# Patient Record
Sex: MALE | Race: WHITE
[De-identification: names, ages, dates, MRNs, and addresses within clinical notes are randomized per-mention and may not be internally consistent; named-entity substitution may affect disease eponyms.]

---

## 2019-04-05 ENCOUNTER — HOSPITAL ENCOUNTER (EMERGENCY)
Dept: HOSPITAL 62 - ER | Age: 59
LOS: 1 days | Discharge: HOME | End: 2019-04-06
Payer: OTHER GOVERNMENT

## 2019-04-05 DIAGNOSIS — G54.2: Primary | ICD-10-CM

## 2019-04-05 DIAGNOSIS — R20.2: ICD-10-CM

## 2019-04-05 DIAGNOSIS — M25.511: ICD-10-CM

## 2019-04-05 DIAGNOSIS — F17.200: ICD-10-CM

## 2019-04-05 PROCEDURE — 99283 EMERGENCY DEPT VISIT LOW MDM: CPT

## 2019-04-05 PROCEDURE — 96372 THER/PROPH/DIAG INJ SC/IM: CPT

## 2019-04-05 PROCEDURE — 73030 X-RAY EXAM OF SHOULDER: CPT

## 2019-04-06 VITALS — DIASTOLIC BLOOD PRESSURE: 88 MMHG | SYSTOLIC BLOOD PRESSURE: 132 MMHG

## 2019-04-06 NOTE — ER DOCUMENT REPORT
ED General





- General


Chief Complaint: Shoulder Pain


Stated Complaint: SHOULDER PAIN


Time Seen by Provider: 04/06/19 00:41


Notes: 





Patient is a 58-year-old male without chronic medical problems who presents with

36 hours of right shoulder pain as well as paresthesias in his right forearm and

right hand.  Pain is regarded as being severe, was gradual in onset, has been 

worsening since that time.  Described as a throbbing, burning pain.  States the 

pain is worsened by lying down and trying to sleep.  Has tried ibuprofen and 

Tylenol with no improvement.  Denies any known injury.  No history of similar 

symptoms in the past.  Has not seen his general physician regarding today's 

concerns.  Denies any focal weakness or loss of sensation.  He is right-hand 

dominant.  Has a remote history of a cervical spine surgery without fusion.


TRAVEL OUTSIDE OF THE [place] IN LAST 30 DAYS: No





- Related Data


Allergies/Adverse Reactions: 


                                        





No Known Allergies Allergy (Unverified 04/06/19 01:41)


   











Past Medical History





- General


Information source: Patient





- Social History


Smoking Status: Current Every Day Smoker


Chew tobacco use (# tins/day): No


Frequency of alcohol use: None


Drug Abuse: None


Lives with: Alone


Family History: Reviewed & Not Pertinent


Patient has suicidal ideation: No


Patient has homicidal ideation: No


Renal/ Medical History: Denies: Hx Peritoneal Dialysis


Past Surgical History: Reports: Hx Orthopedic Surgery - neck x 2





Review of Systems





- Review of Systems


Notes: 





Constitutional: Negative for fever.


HENT: Negative for sore throat.


Eyes: Negative for visual changes.


Cardiovascular: Negative for chest pain.


Respiratory: Negative for shortness of breath.


Gastrointestinal: Negative for abdominal pain, vomiting or diarrhea.


Genitourinary: Negative for dysuria.


Musculoskeletal: Positive for right shoulder pain


Skin: Negative for rash.


Neurological: Negative for headaches, weakness or numbness.  Positive for 

paresthesias of the right forearm and right hand





10 point ROS negative except as marked above and in HPI.





Physical Exam





- Vital signs


Vitals: 





                                        











Temp Pulse Resp BP Pulse Ox


 


 97.8 F   68   20   135/86 H  99 


 


 04/05/19 23:36  04/05/19 23:36  04/05/19 23:36  04/05/19 23:36  04/05/19 23:36











Interpretation: Normal


Notes: 





PHYSICAL EXAMINATION:





GENERAL: Well-appearing, well-nourished and in no acute distress.





HEAD: Atraumatic, normocephalic.





EYES: Pupils equal round and reactive to light, extraocular movements intact, 

sclera anicteric, conjunctiva are normal.





ENT: nares patent, oropharynx clear without exudates.  Moist mucous membranes.





NECK: Normal range of motion, supple without lymphadenopathy, no midline 

cervical spine tenderness, step-offs or deformities.  Mild pain on probation of 

the right trapezius.  Pain with axial loading with the head tilted towards the 

right





LUNGS: Breath sounds clear to auscultation bilaterally and equal.  No wheezes 

rales or rhonchi.





HEART: Regular rate and rhythm without murmurs





ABDOMEN: Soft, nontender, normoactive bowel sounds.  No guarding, no rebound.  

No masses appreciated.





EXTREMITIES: Normal range of motion, no pitting or edema.  No cyanosis.





NEUROLOGICAL: RMU motor and sensory distribution is intact bilaterally including

against resistance on motor testing.  5 out of 5 biceps and triceps strength 

bilaterally.





PSYCH: Normal mood, normal affect.





SKIN: Warm, Dry, normal turgor, no rashes or lesions noted.





Course





- Re-evaluation


Re-evalutation: 





04/06/19 01:55


Patient presents with 36 hours of pain in his right shoulder as well as 

paresthesias in his right forearm and hand.  Has a history of cervical spine 

surgery.  On exam no focal neurologic deficits.  RMU motor and sensory 

distribution is intact bilaterally including against resistance on motor 

testing.  X-ray of the right shoulder is normal.  Patient has increased pain 

with tilting of the head toward the right with axial loading.  Examination of 

the shoulder, trapezius and arm otherwise unremarkable without evidence of 

swelling, deformity or effusions.  Suspect cervical nerve root impingement given

history and exam.  Very low clinical suspicion for ligamentous injury of the 

shoulder, skeletal injury, stroke, carotid artery dissection or alternative 

life-threatening pathology.  Patient has been started on gabapentin, anti-

inflammatories, tramadol.  At this time will discharge with return precautions 

and follow-up recommendations.  Verbal discharge instructions given a the 

bedside and opportunity for questions given. Medication warnings reviewed. 

Patient is in agreement with this plan and has verbalized understanding of 

return precautions and the need for primary care follow-up in the next 24-72 

hours.





- Vital Signs


Vital signs: 





                                        











Temp Pulse Resp BP Pulse Ox


 


 97.8 F   68   20   135/86 H  99 


 


 04/05/19 23:36  04/05/19 23:36  04/05/19 23:36  04/05/19 23:36  04/05/19 23:36














- Diagnostic Test


Radiology reviewed: Image reviewed, Reports reviewed


Radiology results interpreted by me: 





04/06/19 01:56


Right shoulder x-ray: No acute fracture or dislocation





Discharge





- Discharge


Clinical Impression: 


 Cervical nerve root impingement





Right shoulder pain


Qualifiers:


 Chronicity: acute Qualified Code(s): M25.511 - Pain in right shoulder





Condition: Good


Disposition: HOME, SELF-CARE


Additional Instructions: 


Your pain is related to impingement one of your cervical nerve roots and will 

take 6-8 weeks completely resolved.  For your pain: Take ibuprofen 600 mg and 

acetaminophen 1000 mg every 6 hours together as needed for pain.  If this does 

not control your pain you may take 50 mg of tramadol every 6 hours.  Use 

gabapentin 300 mg 3 times daily.  In addition to this, purchased the product 

that is sold over-the-counter cold Aspercreme with lidocaine.  Apply to the 

affected area per bottle instructions.  You should also apply heat to the area 

regularly using an electric heating plant pad.  Return to the emergency 

department immediately if you develop weakness, loss of sensation, chest pain, 

shortness of breath, have worsening of your symptoms, or any other symptoms that

are worrisome to you.


Prescriptions: 


Tramadol HCl [Ultram] 50 mg PO Q6HP PRN #8 tablet


 PRN Reason: 


Gabapentin [Neurontin 300 mg Capsule] 300 mg PO Q8 #90 cap

## 2019-04-13 ENCOUNTER — HOSPITAL ENCOUNTER (EMERGENCY)
Dept: HOSPITAL 62 - ER | Age: 59
LOS: 1 days | Discharge: HOME | End: 2019-04-14
Payer: OTHER GOVERNMENT

## 2019-04-13 VITALS — DIASTOLIC BLOOD PRESSURE: 92 MMHG | SYSTOLIC BLOOD PRESSURE: 151 MMHG

## 2019-04-13 DIAGNOSIS — G54.2: Primary | ICD-10-CM

## 2019-04-13 DIAGNOSIS — M25.511: ICD-10-CM

## 2019-04-13 DIAGNOSIS — F17.200: ICD-10-CM

## 2019-04-13 PROCEDURE — 99284 EMERGENCY DEPT VISIT MOD MDM: CPT

## 2019-04-13 PROCEDURE — 93010 ELECTROCARDIOGRAM REPORT: CPT

## 2019-04-13 PROCEDURE — 93005 ELECTROCARDIOGRAM TRACING: CPT

## 2019-04-13 NOTE — RADIOLOGY REPORT (SQ)
3 VIEWS OF THE RIGHT SHOULDER



HISTORY: Shoulder pain.



COMPARISON: 4/6/2019



FINDINGS:



No acute fracture is seen. The joint spaces are preserved. The

soft tissues are unremarkable.



IMPRESSION:



Unremarkable right shoulder radiographs.

## 2019-09-20 ENCOUNTER — HOSPITAL ENCOUNTER (EMERGENCY)
Dept: HOSPITAL 62 - ER | Age: 59
Discharge: LEFT BEFORE DISCHARGE | End: 2019-09-20
Payer: OTHER GOVERNMENT

## 2019-09-20 VITALS — SYSTOLIC BLOOD PRESSURE: 142 MMHG | DIASTOLIC BLOOD PRESSURE: 98 MMHG

## 2019-09-20 DIAGNOSIS — Z53.21: Primary | ICD-10-CM

## 2019-09-20 DIAGNOSIS — M79.673: ICD-10-CM

## 2020-06-17 NOTE — ER DOCUMENT REPORT
ED General





- General


Chief Complaint: Shoulder Pain


Stated Complaint: RIGHT SOULDER AND SIDE PAIN


Time Seen by Provider: 04/13/19 22:44


Primary Care Provider: 


[facility] [[facility] - Follow up as needed


Notes: 





Patient is a 58-year-old male with a past medical history of current everyday 

tobacco use who presents with ongoing right shoulder pain.  Patient was seen 

partially 1 week ago by me the same symptoms.  States that he has been using 

gabapentin, ibuprofen, Tylenol with moderate relief of the symptoms but states 

that they are often still so severe that he cannot sleep at night.  Does 

describe the pain to his right shoulder as being severe, throbbing, constant 

pain worsened by any attempt at movement of the extremity.  He states that he 

[facility] but has not if been able to get an 

appointment.  He denies any loss of sensation, motor weakness, chest pain, 

shortness of breath.  States his symptoms are not different but they are not 

improving from when I last saw him.


TRAVEL OUTSIDE OF THE [place] IN LAST 30 DAYS: No





- Related Data


Allergies/Adverse Reactions: 


                                        





No Known Allergies Allergy (Verified 04/13/19 21:36)


   











Past Medical History





- General


Information source: Patient





- Social History


Smoking Status: Current Every Day Smoker


Frequency of alcohol use: None


Drug Abuse: None


Lives with: Family


Family History: Reviewed & Not Pertinent


Patient has suicidal ideation: No


Patient has homicidal ideation: No


Renal/ Medical History: Denies: Hx Peritoneal Dialysis


Past Surgical History: Reports: Hx Orthopedic Surgery - neck x 2





Review of Systems





- Review of Systems


Notes: 





Constitutional: Negative for fever.


HENT: Negative for sore throat.


Eyes: Negative for visual changes.


Cardiovascular: Negative for chest pain.


Respiratory: Negative for shortness of breath.


Gastrointestinal: Negative for abdominal pain, vomiting or diarrhea.


Genitourinary: Negative for dysuria.


Musculoskeletal: Positive for right shoulder pain


Skin: Negative for rash.


Neurological: Negative for headaches, weakness or numbness.





10 point ROS negative except as marked above and in HPI.





Physical Exam





- Vital signs


Vitals: 


                                        











Temp Pulse Resp BP Pulse Ox


 


 98.4 F   65   20   151/92 H  96 


 


 04/13/19 21:43  04/13/19 21:43  04/13/19 21:43  04/13/19 21:43  04/13/19 21:43











Interpretation: Hypertensive


Notes: 





PHYSICAL EXAMINATION:





GENERAL: Appears moderately uncomfortable but in no acute distress





HEAD: Atraumatic, normocephalic.





EYES: Pupils equal round and reactive to light, extraocular movements intact, 

sclera anicteric, conjunctiva are normal.





ENT: nares patent, oropharynx clear without exudates.  Moist mucous membranes.





NECK: Normal range of motion, supple without lymphadenopathy





LUNGS: Breath sounds clear to auscultation bilaterally and equal.  No wheezes 

rales or rhonchi.





HEART: Regular rate and rhythm without murmurs





ABDOMEN: Soft, nontender, normoactive bowel sounds.  No guarding, no rebound.  

No masses appreciated.





EXTREMITIES: Patient is unable to range the right shoulder above 90 degrees and 

has significant pain with elevation above 45 degrees.  No obvious erythema, 

fluctuance or warmth to the joint.  Extremity examination otherwise globally 

unremarkable.





NEUROLOGICAL: RMU motor and sensory distribution is intact including against 

resistance bilaterally.  5 out of 5 biceps and triceps strength bilaterally.





PSYCH: Normal mood, normal affect.





SKIN: Warm, Dry, normal turgor, no rashes or lesions noted.





Course





- Re-evaluation


Re-evalutation: 





04/14/19 00:01


Patient presents with signs and symptoms most consistent with a cervical nerve 

root impingement likely at C6-7.  I did see this patient approximately 1 week 

ago for the same and he states he has returned because his pain is not 

controlled, symptoms not improving and he has not been able to follow with his 

primary care doctor.  The pain pattern continues to be consistent patient has 

severe pain in his right shoulder in the periscapular region, on the right 

trapezius radiating down the right upper extremity.  I do not suspect ACS.  The 

patient has no chest pain, no shortness of breath, no pleuritic pain.  The pain 

is dramatically worsened by movement of the shoulder and neck.  There is no 

visible swelling, deformity or erythema to the area.  There is no symptomology 

to suggest a septic joint.  X-ray continues to be without any evidence of 

fracture, effusion or subluxation. .  On neurologic exam patient has 5 out of 5 

biceps and triceps strength.  RMU motor and sensory distribution including 

against resistance on motor testing is noted to be normal.  2+ radial pulse and 

capillary refill is less than 1 second in all digits.  EKG likewise 

unremarkable.  Patient has had improvement of symptoms after receiving analgesia

here in the emergency department.  Advised supportive pillow for sleep at night,

have treated with NSAIDs, have given a small amount of oral morphine for 

nighttime until patient can follow-up with his primary doctor.  At this time 

will discharge with return precautions and follow-up recommendations.  Verbal 

discharge instructions given a the bedside and opportunity for questions given. 

Medication warnings reviewed. Patient is in agreement with this plan and has 

verbalized understanding of return precautions and the need for primary care 

follow-up in the next 24-72 hours.








- Vital Signs


Vital signs: 


                                        











Temp Pulse Resp BP Pulse Ox


 


 98.4 F   65   20   151/92 H  96 


 


 04/13/19 21:43  04/13/19 21:43  04/13/19 21:43  04/13/19 21:43  04/13/19 21:43














- Diagnostic Test


Radiology reviewed: Image reviewed, Reports reviewed


Radiology results interpreted by me: 





04/14/19 05:14


Right shoulder x-ray: No acute fracture or dislocation





- EKG Interpretation by Me


Additional EKG results interpreted by me: 





04/14/19 05:14


Sinus rhythm, rate 60.  No ST elevations or depressions.  QTC is 404.





Discharge





- Discharge


Clinical Impression: 


 Cervical nerve root impingement





Right shoulder pain


Qualifiers:


 Chronicity: acute Qualified Code(s): M25.511 - Pain in right shoulder





Condition: Good


Disposition: HOME, SELF-CARE


Additional Instructions: 


Your pain is related to impingement one of your cervical nerve roots and will 

take 6-8 weeks completely resolved.  For your pain: Take ibuprofen 600 mg and 

acetaminophen 1000 mg every 6 hours together as needed for pain.  If this does 

not control your pain you may take 15 mg of oral morphine every 4 hours as 

needed.  Please be very careful about using the oral morphine and only use this 

for severe pain.  In addition to this, purchased the product that is sold 

over-the-counter cold Aspercreme with lidocaine.  Apply to the affected area per

bottle instructions.  You should also apply heat to the area regularly using an 

electric heating plant pad.  Return to the emergency department immediately if 

you develop weakness, loss of sensation, chest pain, shortness of breath, have 

worsening of your symptoms, or any other symptoms that are worrisome to you.


Prescriptions: 


Morphine Sulfate [Morphine Ir 15 mg Tablet] 15 mg PO Q8HP PRN #10 tablet


 PRN Reason: 


Referrals: 


[facility] [[facility]] - Follow up as needed 17-Jun-2020 12:48

## 2022-03-02 NOTE — EKG REPORT
Patient resting in bed. Alert and oriented. Pain in left foot 2/10 and tolerable. Dressing changed on left foot wound. Medihoney and mepilex applied. Breakfast eaten. VSS. Morning medications given without difficulty. Patient ambulates independently in room. Call light and belongings within reach. Will continue to monitor.      Electronically signed by Yolanda Hwang RN on 3/2/2022 at 1:35 PM SEVERITY:- OTHERWISE NORMAL ECG -

SINUS RHYTHM

LEFT AXIS DEVIATION

:

Confirmed by: Liu Her 14-Apr-2019 23:41:51

## 2023-10-09 ENCOUNTER — LAB OUTSIDE AN ENCOUNTER (OUTPATIENT)
Dept: RURAL CLINIC 2 | Facility: CLINIC | Age: 63
End: 2023-10-09

## 2023-10-09 ENCOUNTER — OFFICE VISIT (OUTPATIENT)
Dept: RURAL CLINIC 2 | Facility: CLINIC | Age: 63
End: 2023-10-09
Payer: OTHER GOVERNMENT

## 2023-10-09 ENCOUNTER — DASHBOARD ENCOUNTERS (OUTPATIENT)
Age: 63
End: 2023-10-09

## 2023-10-09 VITALS
HEART RATE: 96 BPM | TEMPERATURE: 97.5 F | BODY MASS INDEX: 26.22 KG/M2 | HEIGHT: 69 IN | WEIGHT: 177 LBS | SYSTOLIC BLOOD PRESSURE: 120 MMHG | DIASTOLIC BLOOD PRESSURE: 85 MMHG

## 2023-10-09 DIAGNOSIS — K92.0 DARK EMESIS: ICD-10-CM

## 2023-10-09 DIAGNOSIS — K21.9 ACID REFLUX: ICD-10-CM

## 2023-10-09 DIAGNOSIS — Z12.11 SCREENING FOR MALIGNANT NEOPLASM OF COLON: ICD-10-CM

## 2023-10-09 PROBLEM — 422587007: Status: ACTIVE | Noted: 2023-10-09

## 2023-10-09 PROBLEM — 235595009: Status: ACTIVE | Noted: 2023-10-09

## 2023-10-09 PROBLEM — 305058001: Status: ACTIVE | Noted: 2023-10-09

## 2023-10-09 PROBLEM — 8765009: Status: ACTIVE | Noted: 2023-10-09

## 2023-10-09 PROCEDURE — 99243 OFF/OP CNSLTJ NEW/EST LOW 30: CPT | Performed by: INTERNAL MEDICINE

## 2023-10-09 PROCEDURE — 99203 OFFICE O/P NEW LOW 30 MIN: CPT | Performed by: INTERNAL MEDICINE

## 2023-10-09 RX ORDER — MEPERIDINE HYDROCHLORIDE 50 MG/ML
1 ML AS NEEDED INJECTION, SOLUTION INTRAMUSCULAR; INTRAVENOUS; SUBCUTANEOUS
Status: ACTIVE | COMMUNITY

## 2023-10-09 RX ORDER — OMEPRAZOLE 20 MG/1
1 CAPSULE 30 MINUTES BEFORE MORNING MEAL CAPSULE, DELAYED RELEASE ORAL ONCE A DAY
Status: ACTIVE | COMMUNITY

## 2023-10-09 RX ORDER — ONDANSETRON 8 MG/1
1 TABLET ON THE TONGUE AND ALLOW TO DISSOLVE  AS NEEDED TABLET, ORALLY DISINTEGRATING ORAL ONCE A DAY
Status: ACTIVE | COMMUNITY

## 2023-10-09 RX ORDER — ALLOPURINOL 300 MG/1
1 TABLET TABLET ORAL ONCE A DAY
Status: DISCONTINUED | COMMUNITY

## 2023-10-09 RX ORDER — GABAPENTIN 300 MG/1
1 CAPSULE CAPSULE ORAL ONCE A DAY
Status: ACTIVE | COMMUNITY

## 2023-10-09 RX ORDER — ASPIRIN 81 MG/81MG
1 CAPSULE CAPSULE ORAL ONCE A DAY
Status: ACTIVE | COMMUNITY

## 2023-10-09 RX ORDER — ROSUVASTATIN CALCIUM 40 MG/1
1 TABLET TABLET, FILM COATED ORAL ONCE A DAY
Status: ACTIVE | COMMUNITY

## 2023-10-09 RX ORDER — PROCHLORPERAZINE MALEATE 10 MG
1 TABLET AS NEEDED TABLET ORAL THREE TIMES A DAY
Status: ACTIVE | COMMUNITY

## 2023-10-09 RX ORDER — CYCLOBENZAPRINE HYDROCHLORIDE 10 MG/1
1 TABLET AT BEDTIME AS NEEDED TABLET, FILM COATED ORAL ONCE A DAY
Status: ACTIVE | COMMUNITY

## 2023-10-09 NOTE — HPI-ZZZTODAY'S VISIT
The patient is a 62-year-old gentleman who presents on referral from Dr. Lokesh López for the evaluation of an upper endoscopy for history of GERD with dark emesis and screening colonoscopy.  A copy of this document to be sent to the referring provider.  Past medical history is significant for non-Hodgkin's lymphoma stage IV and follicular lymphoma on active therapy.  Over the past several months the patient reports breakthrough GERD with intermittent emesis occurring a couple of times a week and darker emesis in the past few weeks with the last episode occurring 4 days ago.  He continues on omeprazole daily and Zofran as needed.  No complaints of abdominal pain or trouble swallowing.  The patient is due for a screening colonoscopy with his last procedure completed a little over 10 years ago.  He denies abdominal pain, constipation, diarrhea or rectal bleeding.  Family history is negative for colon cancer.

## 2023-11-22 ENCOUNTER — OFFICE VISIT (OUTPATIENT)
Dept: RURAL MEDICAL CENTER 4 | Facility: MEDICAL CENTER | Age: 63
End: 2023-11-22

## 2023-12-27 ENCOUNTER — OFFICE VISIT (OUTPATIENT)
Dept: RURAL MEDICAL CENTER 4 | Facility: MEDICAL CENTER | Age: 63
End: 2023-12-27
Payer: OTHER GOVERNMENT

## 2023-12-27 DIAGNOSIS — K29.60 ADENOPAPILLOMATOSIS GASTRICA: ICD-10-CM

## 2023-12-27 DIAGNOSIS — K92.0 BLOODY EMESIS: ICD-10-CM

## 2023-12-27 DIAGNOSIS — K22.70 BARRETT ESOPHAGUS: ICD-10-CM

## 2023-12-27 DIAGNOSIS — Z12.11 COLON CANCER SCREENING: ICD-10-CM

## 2023-12-27 PROCEDURE — 45378 DIAGNOSTIC COLONOSCOPY: CPT | Performed by: INTERNAL MEDICINE

## 2023-12-27 PROCEDURE — 43239 EGD BIOPSY SINGLE/MULTIPLE: CPT | Performed by: INTERNAL MEDICINE

## 2024-09-30 ENCOUNTER — OFFICE VISIT (OUTPATIENT)
Dept: RURAL CLINIC 2 | Facility: CLINIC | Age: 64
End: 2024-09-30

## 2024-09-30 VITALS
BODY MASS INDEX: 26.36 KG/M2 | SYSTOLIC BLOOD PRESSURE: 125 MMHG | DIASTOLIC BLOOD PRESSURE: 78 MMHG | HEART RATE: 76 BPM | TEMPERATURE: 97.7 F | WEIGHT: 178 LBS | HEIGHT: 69 IN

## 2024-09-30 PROBLEM — 235943006: Status: ACTIVE | Noted: 2024-09-30

## 2024-09-30 PROBLEM — 302870006: Status: ACTIVE | Noted: 2024-09-30

## 2024-09-30 RX ORDER — ASPIRIN 81 MG/81MG
1 CAPSULE CAPSULE ORAL ONCE A DAY
Status: DISCONTINUED | COMMUNITY

## 2024-09-30 RX ORDER — DICYCLOMINE HYDROCHLORIDE 10 MG/1
2 CAPSULES CAPSULE ORAL THREE TIMES A DAY
Status: ACTIVE | COMMUNITY

## 2024-09-30 RX ORDER — POLYETHYLENE GLYCOL 1450
AS DIRECTED POWDER (GRAM) MISCELLANEOUS
Status: ACTIVE | COMMUNITY

## 2024-09-30 RX ORDER — DOCUSATE SODIUM 100 MG/1
1 CAPSULE AS NEEDED CAPSULE ORAL ONCE A DAY
Status: DISCONTINUED | COMMUNITY

## 2024-09-30 RX ORDER — ONDANSETRON 8 MG/1
1 TABLET ON THE TONGUE AND ALLOW TO DISSOLVE  AS NEEDED TABLET, ORALLY DISINTEGRATING ORAL ONCE A DAY
Status: ACTIVE | COMMUNITY

## 2024-09-30 RX ORDER — PROCHLORPERAZINE MALEATE 10 MG
1 TABLET AS NEEDED TABLET ORAL THREE TIMES A DAY
Status: ACTIVE | COMMUNITY

## 2024-09-30 RX ORDER — CYCLOBENZAPRINE HYDROCHLORIDE 10 MG/1
1 TABLET AT BEDTIME AS NEEDED TABLET, FILM COATED ORAL ONCE A DAY
Status: ACTIVE | COMMUNITY

## 2024-09-30 RX ORDER — CETIRIZINE HYDROCHLORIDE 10 MG/1
1 TABLET TABLET, FILM COATED ORAL ONCE A DAY
Status: ACTIVE | COMMUNITY

## 2024-09-30 RX ORDER — GEMFIBROZIL 600 MG/1
1 TABLET 30 MINUTES BEFORE MORNING AND EVENING MEALS TABLET, FILM COATED ORAL TWICE A DAY
Status: ACTIVE | COMMUNITY

## 2024-09-30 RX ORDER — OMEPRAZOLE 40 MG/1
1 CAPSULE 30 MINUTES BEFORE MORNING MEAL CAPSULE, DELAYED RELEASE ORAL ONCE A DAY
Status: ACTIVE | COMMUNITY

## 2024-09-30 RX ORDER — ROSUVASTATIN CALCIUM 40 MG/1
1 TABLET TABLET, FILM COATED ORAL ONCE A DAY
Status: DISCONTINUED | COMMUNITY

## 2024-09-30 RX ORDER — ROSUVASTATIN CALCIUM 40 MG/1
1 TABLET TABLET, COATED ORAL ONCE A DAY
Status: DISCONTINUED | COMMUNITY

## 2024-09-30 RX ORDER — MEPERIDINE HYDROCHLORIDE 50 MG/ML
1 ML AS NEEDED INJECTION, SOLUTION INTRAMUSCULAR; INTRAVENOUS; SUBCUTANEOUS
Status: ACTIVE | COMMUNITY

## 2024-09-30 RX ORDER — GABAPENTIN 400 MG/1
1 CAPSULE CAPSULE ORAL ONCE A DAY
Status: ACTIVE | COMMUNITY

## 2024-09-30 NOTE — HPI-ZZZTODAY'S VISIT
The patient is a 62-year-old gentleman who presents on referral from Dr. Lokesh López for the evaluation of an upper endoscopy for history of GERD with dark emesis and screening colonoscopy.  A copy of this document to be sent to the referring provider.  Past medical history is significant for non-Hodgkin's lymphoma stage IV and follicular lymphoma on active therapy.  Over the past several months the patient reports breakthrough GERD with intermittent emesis occurring a couple of times a week and darker emesis in the past few weeks with the last episode occurring 4 days ago.  He continues on omeprazole daily and Zofran as needed.  No complaints of abdominal pain or trouble swallowing.  The patient is due for a screening colonoscopy with his last procedure completed a little over 10 years ago.  He denies abdominal pain, constipation, diarrhea or rectal bleeding.  Family history is negative for colon cancer. 0930/2024 The patient is following up from a recent hospitalization and was seen for upper abdominal pain.  CT scan of the abdomen revealed mild pancreatitis without abscess.  Lipase level was elevated in the low 800s.  He spent 2 days in the hospital with aggressive IV fluid therapy and his pain resolved.  The cause of his pancreatitis is unknown.  He followed up with his primary care provider Dr. Rosales  with [facility] and he informed me his treatment for high triglycerides has been adjusted. He is currently feeling well and denies any further abdominal pain and is tolerating a regular diet.

## 2024-11-18 ENCOUNTER — LAB OUTSIDE AN ENCOUNTER (OUTPATIENT)
Dept: RURAL CLINIC 2 | Facility: CLINIC | Age: 64
End: 2024-11-18